# Patient Record
Sex: FEMALE | Race: WHITE | HISPANIC OR LATINO | Employment: FULL TIME | URBAN - METROPOLITAN AREA
[De-identification: names, ages, dates, MRNs, and addresses within clinical notes are randomized per-mention and may not be internally consistent; named-entity substitution may affect disease eponyms.]

---

## 2021-12-29 ENCOUNTER — HOSPITAL ENCOUNTER (OUTPATIENT)
Dept: GENERAL RADIOLOGY | Facility: HOSPITAL | Age: 56
Discharge: HOME | End: 2021-12-29

## 2021-12-29 ENCOUNTER — LAB (OUTPATIENT)
Dept: LAB | Facility: HOSPITAL | Age: 56
End: 2021-12-29

## 2021-12-29 ENCOUNTER — OFFICE VISIT (OUTPATIENT)
Dept: INTERNAL MEDICINE | Facility: CLINIC | Age: 56
End: 2021-12-29

## 2021-12-29 VITALS
OXYGEN SATURATION: 98 % | BODY MASS INDEX: 27.17 KG/M2 | SYSTOLIC BLOOD PRESSURE: 122 MMHG | WEIGHT: 173.1 LBS | HEIGHT: 67 IN | DIASTOLIC BLOOD PRESSURE: 84 MMHG | HEART RATE: 75 BPM

## 2021-12-29 DIAGNOSIS — L40.9 PSORIASIS: ICD-10-CM

## 2021-12-29 DIAGNOSIS — Z12.11 SCREEN FOR COLON CANCER: ICD-10-CM

## 2021-12-29 DIAGNOSIS — F51.01 PRIMARY INSOMNIA: ICD-10-CM

## 2021-12-29 DIAGNOSIS — M25.552 CHRONIC HIP PAIN, LEFT: ICD-10-CM

## 2021-12-29 DIAGNOSIS — Z00.00 HEALTHCARE MAINTENANCE: Primary | ICD-10-CM

## 2021-12-29 DIAGNOSIS — G89.29 CHRONIC HIP PAIN, LEFT: ICD-10-CM

## 2021-12-29 LAB
ALBUMIN SERPL-MCNC: 4.4 G/DL (ref 3.5–5.2)
ALBUMIN/GLOB SERPL: 1.7 G/DL
ALP SERPL-CCNC: 87 U/L (ref 39–117)
ALT SERPL W P-5'-P-CCNC: 17 U/L (ref 1–33)
ANION GAP SERPL CALCULATED.3IONS-SCNC: 11.5 MMOL/L (ref 5–15)
AST SERPL-CCNC: 13 U/L (ref 1–32)
BASOPHILS # BLD AUTO: 0.07 10*3/MM3 (ref 0–0.2)
BASOPHILS NFR BLD AUTO: 1.1 % (ref 0–1.5)
BILIRUB SERPL-MCNC: 0.3 MG/DL (ref 0–1.2)
BUN SERPL-MCNC: 14 MG/DL (ref 6–20)
BUN/CREAT SERPL: 26.9 (ref 7–25)
CALCIUM SPEC-SCNC: 9.3 MG/DL (ref 8.6–10.5)
CHLORIDE SERPL-SCNC: 103 MMOL/L (ref 98–107)
CHOLEST SERPL-MCNC: 221 MG/DL (ref 0–200)
CO2 SERPL-SCNC: 24.5 MMOL/L (ref 22–29)
CREAT SERPL-MCNC: 0.52 MG/DL (ref 0.57–1)
DEPRECATED RDW RBC AUTO: 43.9 FL (ref 37–54)
EOSINOPHIL # BLD AUTO: 0.11 10*3/MM3 (ref 0–0.4)
EOSINOPHIL NFR BLD AUTO: 1.8 % (ref 0.3–6.2)
ERYTHROCYTE [DISTWIDTH] IN BLOOD BY AUTOMATED COUNT: 14 % (ref 12.3–15.4)
GFR SERPL CREATININE-BSD FRML MDRD: 122 ML/MIN/1.73
GLOBULIN UR ELPH-MCNC: 2.6 GM/DL
GLUCOSE SERPL-MCNC: 103 MG/DL (ref 65–99)
HBA1C MFR BLD: 6.37 % (ref 4.8–5.6)
HCT VFR BLD AUTO: 42.4 % (ref 34–46.6)
HCV AB SER DONR QL: NORMAL
HDLC SERPL-MCNC: 42 MG/DL (ref 40–60)
HGB BLD-MCNC: 13.7 G/DL (ref 12–15.9)
LDLC SERPL CALC-MCNC: 154 MG/DL (ref 0–100)
LDLC/HDLC SERPL: 3.6 {RATIO}
LYMPHOCYTES # BLD AUTO: 1.6 10*3/MM3 (ref 0.7–3.1)
LYMPHOCYTES NFR BLD AUTO: 25.6 % (ref 19.6–45.3)
MCH RBC QN AUTO: 27.8 PG (ref 26.6–33)
MCHC RBC AUTO-ENTMCNC: 32.3 G/DL (ref 31.5–35.7)
MCV RBC AUTO: 86 FL (ref 79–97)
MONOCYTES # BLD AUTO: 0.68 10*3/MM3 (ref 0.1–0.9)
MONOCYTES NFR BLD AUTO: 10.9 % (ref 5–12)
NEUTROPHILS NFR BLD AUTO: 3.78 10*3/MM3 (ref 1.7–7)
NEUTROPHILS NFR BLD AUTO: 60.4 % (ref 42.7–76)
PLATELET # BLD AUTO: 293 10*3/MM3 (ref 140–450)
PMV BLD AUTO: 13.5 FL (ref 6–12)
POTASSIUM SERPL-SCNC: 4 MMOL/L (ref 3.5–5.2)
PROT SERPL-MCNC: 7 G/DL (ref 6–8.5)
RBC # BLD AUTO: 4.93 10*6/MM3 (ref 3.77–5.28)
SODIUM SERPL-SCNC: 139 MMOL/L (ref 136–145)
T4 FREE SERPL-MCNC: 1.27 NG/DL (ref 0.93–1.7)
TRIGL SERPL-MCNC: 138 MG/DL (ref 0–150)
TSH SERPL DL<=0.05 MIU/L-ACNC: 4.54 UIU/ML (ref 0.27–4.2)
VLDLC SERPL-MCNC: 25 MG/DL (ref 5–40)
WBC NRBC COR # BLD: 6.25 10*3/MM3 (ref 3.4–10.8)

## 2021-12-29 PROCEDURE — 85025 COMPLETE CBC W/AUTO DIFF WBC: CPT | Performed by: FAMILY MEDICINE

## 2021-12-29 PROCEDURE — 36415 COLL VENOUS BLD VENIPUNCTURE: CPT | Performed by: FAMILY MEDICINE

## 2021-12-29 PROCEDURE — 80053 COMPREHEN METABOLIC PANEL: CPT | Performed by: FAMILY MEDICINE

## 2021-12-29 PROCEDURE — 84439 ASSAY OF FREE THYROXINE: CPT | Performed by: FAMILY MEDICINE

## 2021-12-29 PROCEDURE — 80061 LIPID PANEL: CPT | Performed by: FAMILY MEDICINE

## 2021-12-29 PROCEDURE — 83036 HEMOGLOBIN GLYCOSYLATED A1C: CPT | Performed by: FAMILY MEDICINE

## 2021-12-29 PROCEDURE — 99386 PREV VISIT NEW AGE 40-64: CPT | Performed by: FAMILY MEDICINE

## 2021-12-29 PROCEDURE — 73502 X-RAY EXAM HIP UNI 2-3 VIEWS: CPT

## 2021-12-29 PROCEDURE — 99213 OFFICE O/P EST LOW 20 MIN: CPT | Performed by: FAMILY MEDICINE

## 2021-12-29 PROCEDURE — 86803 HEPATITIS C AB TEST: CPT | Performed by: FAMILY MEDICINE

## 2021-12-29 PROCEDURE — 84443 ASSAY THYROID STIM HORMONE: CPT | Performed by: FAMILY MEDICINE

## 2021-12-29 RX ORDER — PIMECROLIMUS 10 MG/G
1 CREAM TOPICAL DAILY
Qty: 100 G | Refills: 3 | Status: SHIPPED | OUTPATIENT
Start: 2021-12-29 | End: 2022-01-25 | Stop reason: SDUPTHER

## 2021-12-29 RX ORDER — ZOLPIDEM TARTRATE 6.25 MG/1
6.25 TABLET, FILM COATED, EXTENDED RELEASE ORAL NIGHTLY PRN
Qty: 30 TABLET | Refills: 3 | Status: SHIPPED | OUTPATIENT
Start: 2021-12-29 | End: 2023-01-27

## 2021-12-29 RX ORDER — PIMECROLIMUS 10 MG/G
1 CREAM TOPICAL DAILY
COMMUNITY
End: 2021-12-29 | Stop reason: SDUPTHER

## 2021-12-29 RX ORDER — CALCIPOTRIENE 50 UG/G
1 CREAM TOPICAL DAILY
COMMUNITY
End: 2021-12-29 | Stop reason: SDUPTHER

## 2021-12-29 RX ORDER — ATORVASTATIN CALCIUM 10 MG/1
10 TABLET, FILM COATED ORAL DAILY
Qty: 30 TABLET | Refills: 1 | Status: SHIPPED | OUTPATIENT
Start: 2021-12-29 | End: 2022-01-20

## 2021-12-29 RX ORDER — METFORMIN HYDROCHLORIDE 500 MG/1
500 TABLET, EXTENDED RELEASE ORAL
Qty: 30 TABLET | Refills: 1 | Status: SHIPPED | OUTPATIENT
Start: 2021-12-29 | End: 2022-01-20

## 2021-12-29 RX ORDER — CHOLECALCIFEROL (VITAMIN D3) 125 MCG
1 CAPSULE ORAL DAILY
COMMUNITY

## 2021-12-29 RX ORDER — CALCIPOTRIENE 50 UG/G
1 CREAM TOPICAL DAILY
Qty: 60 G | Refills: 1 | Status: SHIPPED | OUTPATIENT
Start: 2021-12-29 | End: 2022-01-25 | Stop reason: SDUPTHER

## 2021-12-29 RX ORDER — DIPHENOXYLATE HYDROCHLORIDE AND ATROPINE SULFATE 2.5; .025 MG/1; MG/1
1 TABLET ORAL DAILY
COMMUNITY

## 2021-12-29 NOTE — PROGRESS NOTES
Subjective   Gladis Chaves is a 56 y.o. female.     Chief Complaint   Patient presents with   • new patient visit   • Annual Exam   • fall 6 months ago - left hip painful         History of Present Illness   Gladis Chaves 56 y.o. female who presents for an Annual Wellness Visit.  she has a history of   Patient Active Problem List   Diagnosis   • Healthcare maintenance   • Chronic hip pain, left   • Screen for colon cancer   • Primary insomnia   • Psoriasis   .  she has been feeling well.  Labs results discussed in detail with the patient.  Plan to update vaccines if needed today.  I  reviewed health maintenance with her as part of my preventative care plan.    Health Habits:  Dental Exam. up to date  Eye Exam. up to date  Exercise: 5 times/week.  Current exercise activities include: walking      The following portions of the patient's history were reviewed and updated as appropriate: allergies, current medications, past family history, past medical history, past social history, past surgical history and problem list.    Review of Systems   Constitutional: Negative for appetite change, fever and unexpected weight change.   HENT: Negative for ear pain, facial swelling and sore throat.    Eyes: Negative for pain and visual disturbance.   Respiratory: Negative for chest tightness, shortness of breath and wheezing.    Cardiovascular: Negative for chest pain and palpitations.   Gastrointestinal: Negative for abdominal pain and blood in stool.   Endocrine: Negative.    Genitourinary: Negative for difficulty urinating and hematuria.   Musculoskeletal: Negative for joint swelling.   Neurological: Negative for tremors, seizures and syncope.   Hematological: Negative for adenopathy.   Psychiatric/Behavioral: Positive for sleep disturbance. Negative for dysphoric mood. The patient is not nervous/anxious.        Objective   Physical Exam  Vitals and nursing note reviewed.   Constitutional:       Appearance: Normal appearance.  She is well-developed. She is not diaphoretic.   HENT:      Head: Normocephalic and atraumatic.      Right Ear: Tympanic membrane, ear canal and external ear normal.      Left Ear: Tympanic membrane, ear canal and external ear normal.   Eyes:      General: Lids are normal. No scleral icterus.     Extraocular Movements: Extraocular movements intact.      Conjunctiva/sclera: Conjunctivae normal.   Neck:      Thyroid: No thyroid mass or thyromegaly.      Vascular: No carotid bruit or JVD.   Cardiovascular:      Rate and Rhythm: Normal rate and regular rhythm.      Pulses: Normal pulses.           Radial pulses are 2+ on the right side and 2+ on the left side.      Heart sounds: Normal heart sounds. No murmur heard.      Pulmonary:      Effort: Pulmonary effort is normal. No respiratory distress.      Breath sounds: Normal breath sounds.   Abdominal:      Palpations: Abdomen is soft.      Tenderness: There is no right CVA tenderness or left CVA tenderness.   Musculoskeletal:      Cervical back: Normal range of motion.      Right lower leg: No edema.      Left lower leg: No edema.   Skin:     General: Skin is warm and dry.      Coloration: Skin is not pale.      Findings: Erythema and rash present.   Neurological:      General: No focal deficit present.      Mental Status: She is alert and oriented to person, place, and time.      Sensory: No sensory deficit.      Deep Tendon Reflexes: Reflexes are normal and symmetric.   Psychiatric:         Mood and Affect: Mood normal.         Behavior: Behavior normal. Behavior is cooperative.         Thought Content: Thought content normal.         Judgment: Judgment normal.         Assessment/Plan   Diagnoses and all orders for this visit:    1. Healthcare maintenance (Primary)  -     Comprehensive Metabolic Panel  -     Lipid Panel  -     Hemoglobin A1c  -     TSH  -     T4, Free  -     CBC & Differential  -     zolpidem CR (AMBIEN CR) 6.25 MG CR tablet; Take 1 tablet by mouth At  Night As Needed for Sleep.  Dispense: 30 tablet; Refill: 3  -     Hepatitis C Antibody    2. Chronic hip pain, left  -     XR hip w or wo pelvis 2-3 view left    3. Screen for colon cancer  -     Cologuard - Stool, Per Rectum; Future    4. Psoriasis  -     calcipotriene (DOVONEX) 0.005 % cream; Apply 1 application topically to the appropriate area as directed Daily.  Dispense: 60 g; Refill: 1  -     pimecrolimus (ELIDEL) 1 % cream; Apply 1 application topically to the appropriate area as directed Daily.  Dispense: 100 g; Refill: 3    5. Primary insomnia    Continue attempts at healthy lifestyle calorie appropriate diet regular physical activity  She is up-to-date with her mammograms  Discussion regarding colon cancer screening no family history no change in bowel habits appropriate for Cologuard testing.  Encouraged shingles vaccine  Up otherwise as needed for ongoing management of chronic problems including left hip pain psoriasis otherwise preventatively annually            Patient/Caregiver provided printed discharge information.

## 2021-12-29 NOTE — PROGRESS NOTES
"Chief Complaint  new patient visit, Annual Exam, and fall 6 months ago - left hip painful  Insomnia, psoriasis  Subjective          Gladis Chaves presents to [facility]  History of Present Illness  New patient in the office with follow-up for chronic problems of psoriasis left hip pain her psoriasis is well controlled with Dovonex and Elidel and she like refills she has had psoriasis for many years and no specific change or worsening symptoms  Her left hip has been chronic in nature seems to worsen over the last 6 months there is no radiating quality and no specific traumatic event.  Her insomnia is mostly related to difficulty maintaining sleep she has no trouble falling asleep but seems to wake up in about 2 to 3 hours and then she is up for the rest the night chronic in nature she is used over-the-counter remedies without any benefit    Objective   Vital Signs:   /84 (BP Location: Right arm, Patient Position: Sitting, Cuff Size: Adult)   Pulse 75   Ht 170.2 cm (67\")   Wt 78.5 kg (173 lb 1.6 oz)   SpO2 98%   BMI 27.11 kg/m²     Physical Exam  Vitals and nursing note reviewed.   Constitutional:       Appearance: Normal appearance.   HENT:      Head: Normocephalic and atraumatic.   Eyes:      General: No scleral icterus.  Cardiovascular:      Rate and Rhythm: Normal rate and regular rhythm.   Pulmonary:      Effort: Pulmonary effort is normal.      Breath sounds: Normal breath sounds.   Skin:     Findings: Erythema and rash present.   Neurological:      General: No focal deficit present.      Mental Status: She is alert.   Psychiatric:         Mood and Affect: Mood normal.         Behavior: Behavior normal.         Thought Content: Thought content normal.         Judgment: Judgment normal.        Result Review :                   Assessment and Plan    Diagnoses and all orders for this visit:    1. Healthcare maintenance (Primary)  -     Comprehensive Metabolic Panel  -     " Lipid Panel  -     Hemoglobin A1c  -     TSH  -     T4, Free  -     CBC & Differential  -     zolpidem CR (AMBIEN CR) 6.25 MG CR tablet; Take 1 tablet by mouth At Night As Needed for Sleep.  Dispense: 30 tablet; Refill: 3  -     Hepatitis C Antibody    2. Chronic hip pain, left  -     XR hip w or wo pelvis 2-3 view left    3. Screen for colon cancer  -     Cologuard - Stool, Per Rectum; Future    4. Psoriasis  -     calcipotriene (DOVONEX) 0.005 % cream; Apply 1 application topically to the appropriate area as directed Daily.  Dispense: 60 g; Refill: 1  -     pimecrolimus (ELIDEL) 1 % cream; Apply 1 application topically to the appropriate area as directed Daily.  Dispense: 100 g; Refill: 3    5. Primary insomnia        Follow Up   No follow-ups on file.  Patient was given instructions and counseling regarding her condition or for health maintenance advice. Please see specific information pulled into the AVS if appropriate.

## 2021-12-30 DIAGNOSIS — G89.29 CHRONIC HIP PAIN, LEFT: Primary | ICD-10-CM

## 2021-12-30 DIAGNOSIS — M25.552 CHRONIC HIP PAIN, LEFT: Primary | ICD-10-CM

## 2022-01-12 ENCOUNTER — TELEPHONE (OUTPATIENT)
Dept: INTERNAL MEDICINE | Facility: CLINIC | Age: 57
End: 2022-01-12

## 2022-01-12 NOTE — TELEPHONE ENCOUNTER
Caller: Gladis Chaves    Relationship to patient: Self    Best call back number: [phone]     Patient is needing: PATIENT IS CALLING TO CHECK THE STATUS OF A PRIOR AUTHORIZATION FOR THE FOLLOWING MEDICATIONS.  pimecrolimus (ELIDEL) 1 % cream  calcipotriene (DOVONEX) 0.005 % cream    SHE STATES IT SH[facility] GO THROUGH [facility].      [facility] - [place], [place] - [address] AT [address] [facility] - [phone]  - [phone] [phone]    PLEASE ADVISE.

## 2022-01-20 RX ORDER — METFORMIN HYDROCHLORIDE 500 MG/1
TABLET, EXTENDED RELEASE ORAL
Qty: 30 TABLET | Refills: 1 | Status: SHIPPED | OUTPATIENT
Start: 2022-01-20 | End: 2022-02-17

## 2022-01-20 RX ORDER — ATORVASTATIN CALCIUM 10 MG/1
TABLET, FILM COATED ORAL
Qty: 30 TABLET | Refills: 1 | Status: SHIPPED | OUTPATIENT
Start: 2022-01-20 | End: 2022-02-17

## 2022-01-25 ENCOUNTER — OFFICE VISIT (OUTPATIENT)
Dept: INTERNAL MEDICINE | Facility: CLINIC | Age: 57
End: 2022-01-25

## 2022-01-25 ENCOUNTER — LAB (OUTPATIENT)
Dept: LAB | Facility: HOSPITAL | Age: 57
End: 2022-01-25

## 2022-01-25 VITALS
SYSTOLIC BLOOD PRESSURE: 142 MMHG | TEMPERATURE: 97.7 F | WEIGHT: 174.7 LBS | BODY MASS INDEX: 27.42 KG/M2 | HEART RATE: 73 BPM | OXYGEN SATURATION: 99 % | DIASTOLIC BLOOD PRESSURE: 92 MMHG | HEIGHT: 67 IN

## 2022-01-25 DIAGNOSIS — E11.65 TYPE 2 DIABETES MELLITUS WITH HYPERGLYCEMIA, WITHOUT LONG-TERM CURRENT USE OF INSULIN: Primary | ICD-10-CM

## 2022-01-25 DIAGNOSIS — R79.89 ELEVATED TSH: ICD-10-CM

## 2022-01-25 DIAGNOSIS — F51.01 PRIMARY INSOMNIA: ICD-10-CM

## 2022-01-25 DIAGNOSIS — E78.2 MIXED HYPERLIPIDEMIA: ICD-10-CM

## 2022-01-25 DIAGNOSIS — L40.9 PSORIASIS: ICD-10-CM

## 2022-01-25 PROBLEM — E11.9 TYPE 2 DIABETES MELLITUS, WITHOUT LONG-TERM CURRENT USE OF INSULIN: Status: ACTIVE | Noted: 2022-01-25

## 2022-01-25 LAB
T4 FREE SERPL-MCNC: 1.35 NG/DL (ref 0.93–1.7)
TSH SERPL DL<=0.05 MIU/L-ACNC: 4.13 UIU/ML (ref 0.27–4.2)

## 2022-01-25 PROCEDURE — 84443 ASSAY THYROID STIM HORMONE: CPT | Performed by: FAMILY MEDICINE

## 2022-01-25 PROCEDURE — 99213 OFFICE O/P EST LOW 20 MIN: CPT | Performed by: FAMILY MEDICINE

## 2022-01-25 PROCEDURE — 84439 ASSAY OF FREE THYROXINE: CPT | Performed by: FAMILY MEDICINE

## 2022-01-25 PROCEDURE — 36415 COLL VENOUS BLD VENIPUNCTURE: CPT | Performed by: FAMILY MEDICINE

## 2022-01-25 RX ORDER — PIMECROLIMUS 10 MG/G
1 CREAM TOPICAL DAILY
Qty: 100 G | Refills: 3 | Status: SHIPPED | OUTPATIENT
Start: 2022-01-25 | End: 2023-01-27

## 2022-01-25 RX ORDER — CALCIPOTRIENE 50 UG/G
1 CREAM TOPICAL DAILY
Qty: 60 G | Refills: 1 | Status: SHIPPED | OUTPATIENT
Start: 2022-01-25

## 2022-01-25 RX ORDER — AMOXICILLIN 875 MG/1
TABLET, COATED ORAL
COMMUNITY
Start: 2022-01-02 | End: 2022-01-25

## 2022-01-25 NOTE — PROGRESS NOTES
"Chief Complaint  follow up to diabetes, follow up to hyperlipidemia, follow up to insomnia, and Cough (tested COVID negative yesterday at [facility])    Subjective          Gladis Chaves presents to [facility]  History of Present Illness  Patient follows up for ongoing management of chronic problems she has had psoriasis for 20 years improved with elidel and calciporine, which she would like refills  She has tolerated Metformin without any unwanted side effects specifically GI  She has had slightly elevated TSH with normal T4 without any specific symptomology related other than difficulty with weight loss even though she has had some calorie restriction diet  Objective   Vital Signs:   /92 (BP Location: Left arm, Patient Position: Sitting, Cuff Size: Adult)   Pulse 73   Temp 97.7 °F (36.5 °C) (Infrared)   Ht 170.2 cm (67\")   Wt 79.2 kg (174 lb 11.2 oz)   SpO2 99%   BMI 27.36 kg/m²     Physical Exam  Vitals and nursing note reviewed.   Constitutional:       Appearance: Normal appearance.   HENT:      Head: Normocephalic and atraumatic.   Neck:      Comments: No enlarged thyroid   Cardiovascular:      Rate and Rhythm: Normal rate and regular rhythm.   Musculoskeletal:      Cervical back: No rigidity.   Skin:     Comments: legs arms hips scaling erythema plaques   Neurological:      Mental Status: She is alert.   Psychiatric:         Mood and Affect: Mood normal.         Behavior: Behavior normal.         Thought Content: Thought content normal.         Judgment: Judgment normal.        Result Review :     Common labs    Common Labsle 12/29/21 12/29/21 12/29/21 12/29/21    1016 1016 1016 1016   Glucose  103 (A)     BUN  14     Creatinine  0.52 (A)     eGFR Non African Am  122     Sodium  139     Potassium  4.0     Chloride  103     Calcium  9.3     Albumin  4.40     Total Bilirubin  0.3     Alkaline Phosphatase  87     AST (SGOT)  13     ALT (SGPT)  17     WBC 6.25      Hemoglobin " 13.7      Hematocrit 42.4      Platelets 293      Total Cholesterol   221 (A)    Triglycerides   138    HDL Cholesterol   42    LDL Cholesterol    154 (A)    Hemoglobin A1C    6.37 (A)   (A) Abnormal value                      Assessment and Plan    Diagnoses and all orders for this visit:    1. Type 2 diabetes mellitus with hyperglycemia, without long-term current use of insulin (HCC) (Primary)    2. Primary insomnia    3. Mixed hyperlipidemia    4. Elevated TSH  -     TSH  -     T4, Free    5. Psoriasis  -     pimecrolimus (ELIDEL) 1 % cream; Apply 1 application topically to the appropriate area as directed Daily.  Dispense: 100 g; Refill: 3  -     calcipotriene (DOVONEX) 0.005 % cream; Apply 1 application topically to the appropriate area as directed Daily.  Dispense: 60 g; Refill: 1    Follow-up results of thyroid function otherwise as needed or    Follow Up   Return in about 3 months (around 4/25/2022), or if symptoms worsen or fail to improve, for Recheck.  Patient was given instructions and counseling regarding her condition or for health maintenance advice. Please see specific information pulled into the AVS if appropriate.     Check A1c

## 2022-01-26 ENCOUNTER — OFFICE VISIT (OUTPATIENT)
Dept: ORTHOPEDIC SURGERY | Facility: CLINIC | Age: 57
End: 2022-01-26

## 2022-01-26 VITALS — HEIGHT: 67 IN | WEIGHT: 174 LBS | TEMPERATURE: 97.3 F | BODY MASS INDEX: 27.31 KG/M2

## 2022-01-26 DIAGNOSIS — M16.12 PRIMARY OSTEOARTHRITIS OF LEFT HIP: Primary | ICD-10-CM

## 2022-01-26 PROCEDURE — 99203 OFFICE O/P NEW LOW 30 MIN: CPT | Performed by: ORTHOPAEDIC SURGERY

## 2022-01-26 NOTE — PROGRESS NOTES
Patient: Gladis Chaves    YOB: 1965    Medical Record Number: [id number]    Chief Complaints:  Left hip pain    History of Present Illness:     56 y.o. female patient who comes in today for evaluation of a new complaint of  Left hip pain.   Patient states she started having pain last spring which brought about 8 months ago after a fall however since October this past year or about 3 to 4 months ago she feels like things got worse complains of groin pain and stiffness.  Difficulty with some regular activity such as walking.  Does take Advil which she feels helps.        Denies any shooting pain down the leg, weakness, numbness or paresthesias.  Denies any fever shortness of breath.    Allergies: No Known Allergies    Medications:     Home Medications:  Current Outpatient Medications on File Prior to Visit   Medication Sig Dispense Refill   • atorvastatin (LIPITOR) 10 MG tablet TAKE 1 TABLET BY MOUTH EVERY DAY 30 tablet 1   • CALCIUM PO Take 650 mg by mouth Daily.     • Cholecalciferol (Vitamin D3) 50 MCG (2000 UT) tablet Take 1 tablet by mouth Daily.     • metFORMIN ER (GLUCOPHAGE-XR) 500 MG 24 hr tablet TAKE 1 TABLET BY MOUTH EVERY DAY WITH BREAKFAST 30 tablet 1   • multivitamin (THERAGRAN) tablet tablet Take 1 tablet by mouth Daily.     • Boswellia-Glucosamine-Vit D (OSTEO BI-FLEX ONE PER DAY PO) Take 1 tablet by mouth Daily.     • calcipotriene (DOVONEX) 0.005 % cream Apply 1 application topically to the appropriate area as directed Daily. 60 g 1   • pimecrolimus (ELIDEL) 1 % cream Apply 1 application topically to the appropriate area as directed Daily. 100 g 3   • zolpidem CR (AMBIEN CR) 6.25 MG CR tablet Take 1 tablet by mouth At Night As Needed for Sleep. 30 tablet 3     No current facility-administered medications on file prior to visit.     History reviewed. No pertinent past medical history.  History reviewed. No pertinent surgical history.  Social History     Occupational History   • Not  "on file   Tobacco Use   • Smoking status: Never Smoker   • Smokeless tobacco: Never Used   Substance and Sexual Activity   • Alcohol use: Yes     Comment: 1 per month   • Drug use: Not Currently   • Sexual activity: Yes     Partners: Male      Social History     Social History Narrative   • Not on file     Family History   Problem Relation Age of Onset   • Alzheimer's disease Mother    • Heart failure Mother    • Hypertension Mother    • Diabetes Father    • Heart attack Father    • Hypertension Father    • Diabetes Brother    • Glaucoma Paternal Grandmother    • Diabetes Paternal Grandfather    • Lung cancer Paternal Grandfather    • Diabetes Paternal Uncle        Review of Systems:      Constitutional: Denies fever, shaking or chills         All other pertinent positives and negatives as noted above in HPI.    Physical Exam: 56 y.o. female  Vitals:    01/26/22 0847   Temp: 97.3 °F (36.3 °C)   Weight: 78.9 kg (174 lb)   Height: 168.9 cm (66.5\")     General:  Patient is awake and alert.  Appears in no acute distress or discomfort.        Left lower extremity:  Skin is benign.  No palpable masses or adenopathy.  No focal area of tenderness.  Hip motion is limited and uncomfortable.  Positive Stinchfield maneuver.  Good strength with hip flexion, extension, abduction.  Good strength distally with plantarflexion and dorsiflexion of ankle, toes.  Sensation to light touch intact distally in the lower leg and foot.  Good pedal pulses with brisk cap refill.    Radiology: AP pelvis, AP and lateral views of the left hip taken previously reviewed the x-rays show moderate hip osteoarthritis with joint space narrowing, subchondral sclerosis and osteophyte formation.  There are no obvious acute abnormalities, lesions, masses, or other concerning findings.    Comparison films not available    Assessment:  Left hip osteoarthritis      Plan:    I had a lengthy discussion with the patient regarding options, both surgical and " non-surgical.  I have recommended that we start with a conservative approach and suggested anti-inflammatories, physical therapy, and an injection.  All options were thoroughly discussed with the patient.  The patient has elected for conservative treatment at this time she will continue some anti-inflammatories would like [facility] physical therapy.  Possible injection therapy is not really helping her in 3 to 4 weeks she does not want to have any surgery at this time.  Will follow up as needed. Patient understands and agrees.      Linden Schuler MD    01/26/2022    CC to Dimitri Delacruz MD

## 2022-02-14 ENCOUNTER — TELEPHONE (OUTPATIENT)
Dept: INTERNAL MEDICINE | Facility: CLINIC | Age: 57
End: 2022-02-14

## 2022-02-14 NOTE — TELEPHONE ENCOUNTER
Caller: Gladis Chaves    Relationship: Self    Best call back number: [phone]    Which medication are you concerned about: pimecrolimus (ELIDEL) 1 % cream    Who prescribed you this medication: DR IRIZARRY    What are your concerns: PATIENT STATES HER PHARMACY CONTACTED HER TO SAY SHE NEEDS A PRIOR AUTHORIZATION ON THIS MEDICATION.  THEY LEFT THE PRESCRIPTION NUMBER OF [phone]    PLEASE CALL AND ADVISE

## 2022-02-17 RX ORDER — METFORMIN HYDROCHLORIDE 500 MG/1
TABLET, EXTENDED RELEASE ORAL
Qty: 30 TABLET | Refills: 4 | Status: SHIPPED | OUTPATIENT
Start: 2022-02-17 | End: 2022-05-20

## 2022-02-17 RX ORDER — ATORVASTATIN CALCIUM 10 MG/1
TABLET, FILM COATED ORAL
Qty: 30 TABLET | Refills: 4 | Status: SHIPPED | OUTPATIENT
Start: 2022-02-17 | End: 2022-05-20

## 2022-05-20 RX ORDER — ATORVASTATIN CALCIUM 10 MG/1
TABLET, FILM COATED ORAL
Qty: 90 TABLET | Refills: 0 | Status: SHIPPED | OUTPATIENT
Start: 2022-05-20 | End: 2022-08-17

## 2022-05-20 RX ORDER — METFORMIN HYDROCHLORIDE 500 MG/1
TABLET, EXTENDED RELEASE ORAL
Qty: 90 TABLET | Refills: 0 | Status: SHIPPED | OUTPATIENT
Start: 2022-05-20 | End: 2022-08-17

## 2022-06-17 ENCOUNTER — TELEPHONE (OUTPATIENT)
Dept: INTERNAL MEDICINE | Facility: CLINIC | Age: 57
End: 2022-06-17

## 2022-06-17 NOTE — TELEPHONE ENCOUNTER
Caller: Gladis Chaves    Relationship: Self    Best call back number:     What orders are you requesting (i.e. lab or imaging): MAMMOGRAM    In what timeframe would the patient need to come in:     Where will you receive your lab/imaging services:     Additional notes: PATIENT IS CALLING IN TO GET ORDERS FOR A MAMMOGRAM.  SHE SAYS THE IMAGING  OFFICE IS NEXT TO DR PINK OFFICE, SHE DOES NOT KNOW THE NAME OF THE FACILITY.  PATIENT WANTS TO BE CONTACTED ONCE THIS IS COMPLETED.

## 2022-06-20 NOTE — TELEPHONE ENCOUNTER
Caller: Gladis Chaves    Relationship: Self    Best call back number: [phone]*    What is the best time to reach you: MORNINGS    Who are you requesting to speak with (clinical staff, provider,  specific staff member): CLINICAL    What was the call regarding: PATIENT CALLING TO CHECK ON STATUS OF ORDER FOR MAMMOGRAM.    Do you require a callback: YES

## 2022-06-21 DIAGNOSIS — Z12.31 ENCOUNTER FOR SCREENING MAMMOGRAM FOR MALIGNANT NEOPLASM OF BREAST: Primary | ICD-10-CM

## 2022-07-07 ENCOUNTER — HOSPITAL ENCOUNTER (OUTPATIENT)
Dept: MAMMOGRAPHY | Facility: HOSPITAL | Age: 57
Discharge: HOME | End: 2022-07-07
Admitting: FAMILY MEDICINE

## 2022-07-07 DIAGNOSIS — Z12.31 ENCOUNTER FOR SCREENING MAMMOGRAM FOR MALIGNANT NEOPLASM OF BREAST: ICD-10-CM

## 2022-07-07 PROCEDURE — 77063 BREAST TOMOSYNTHESIS BI: CPT

## 2022-07-07 PROCEDURE — 77067 SCR MAMMO BI INCL CAD: CPT

## 2022-07-14 ENCOUNTER — TELEPHONE (OUTPATIENT)
Dept: INTERNAL MEDICINE | Facility: CLINIC | Age: 57
End: 2022-07-14

## 2022-07-14 NOTE — TELEPHONE ENCOUNTER
Caller: Gladis Chaves    Relationship: Self    Best call back number:[phone]     Caller requesting test results: MAMMOGRAM RESULTS     When was the test performed: 07.08.22    Where was the test performed: [facility]    Additional notes: PATIENT LOOKED AT RESULTS ON [facility] AND IS CONCERNED AND CONFUSED AFTER READING RESULTS.     PATIENT IS REQUESTING TO SPEAK WITH DR. IRIZARRY SPECIFICALLY.    PLEASE CALL ASAP TO DISCUSS RESULTS.

## 2022-07-15 DIAGNOSIS — R92.8 ABNORMAL MAMMOGRAM: Primary | ICD-10-CM

## 2022-07-26 ENCOUNTER — OFFICE VISIT (OUTPATIENT)
Dept: INTERNAL MEDICINE | Facility: CLINIC | Age: 57
End: 2022-07-26

## 2022-07-26 ENCOUNTER — LAB (OUTPATIENT)
Dept: LAB | Facility: HOSPITAL | Age: 57
End: 2022-07-26

## 2022-07-26 VITALS
DIASTOLIC BLOOD PRESSURE: 82 MMHG | OXYGEN SATURATION: 100 % | HEART RATE: 67 BPM | WEIGHT: 167.3 LBS | HEIGHT: 67 IN | SYSTOLIC BLOOD PRESSURE: 124 MMHG | BODY MASS INDEX: 26.26 KG/M2

## 2022-07-26 DIAGNOSIS — F51.01 PRIMARY INSOMNIA: ICD-10-CM

## 2022-07-26 DIAGNOSIS — G89.29 CHRONIC HIP PAIN, LEFT: ICD-10-CM

## 2022-07-26 DIAGNOSIS — E78.2 MIXED HYPERLIPIDEMIA: Primary | ICD-10-CM

## 2022-07-26 DIAGNOSIS — E11.65 TYPE 2 DIABETES MELLITUS WITH HYPERGLYCEMIA, WITHOUT LONG-TERM CURRENT USE OF INSULIN: ICD-10-CM

## 2022-07-26 DIAGNOSIS — M25.552 CHRONIC HIP PAIN, LEFT: ICD-10-CM

## 2022-07-26 LAB
ALBUMIN SERPL-MCNC: 4.2 G/DL (ref 3.5–5.2)
ALBUMIN UR-MCNC: <1.2 MG/DL
ALBUMIN/GLOB SERPL: 1.6 G/DL
ALP SERPL-CCNC: 83 U/L (ref 39–117)
ALT SERPL W P-5'-P-CCNC: 16 U/L (ref 1–33)
ANION GAP SERPL CALCULATED.3IONS-SCNC: 10.9 MMOL/L (ref 5–15)
AST SERPL-CCNC: 17 U/L (ref 1–32)
BILIRUB SERPL-MCNC: 0.3 MG/DL (ref 0–1.2)
BUN SERPL-MCNC: 17 MG/DL (ref 6–20)
BUN/CREAT SERPL: 23.6 (ref 7–25)
CALCIUM SPEC-SCNC: 9.2 MG/DL (ref 8.6–10.5)
CHLORIDE SERPL-SCNC: 100 MMOL/L (ref 98–107)
CHOLEST SERPL-MCNC: 147 MG/DL (ref 0–200)
CO2 SERPL-SCNC: 27.1 MMOL/L (ref 22–29)
CREAT SERPL-MCNC: 0.72 MG/DL (ref 0.57–1)
CREAT UR-MCNC: 76.6 MG/DL
EGFRCR SERPLBLD CKD-EPI 2021: 97.7 ML/MIN/1.73
GLOBULIN UR ELPH-MCNC: 2.7 GM/DL
GLUCOSE SERPL-MCNC: 100 MG/DL (ref 65–99)
HBA1C MFR BLD: 6.4 % (ref 4.8–5.6)
HDLC SERPL-MCNC: 44 MG/DL (ref 40–60)
LDLC SERPL CALC-MCNC: 87 MG/DL (ref 0–100)
LDLC/HDLC SERPL: 1.97 {RATIO}
MICROALBUMIN/CREAT UR: NORMAL MG/G{CREAT}
POTASSIUM SERPL-SCNC: 4 MMOL/L (ref 3.5–5.2)
PROT SERPL-MCNC: 6.9 G/DL (ref 6–8.5)
SODIUM SERPL-SCNC: 138 MMOL/L (ref 136–145)
TRIGL SERPL-MCNC: 82 MG/DL (ref 0–150)
VLDLC SERPL-MCNC: 16 MG/DL (ref 5–40)

## 2022-07-26 PROCEDURE — 99214 OFFICE O/P EST MOD 30 MIN: CPT | Performed by: FAMILY MEDICINE

## 2022-07-26 PROCEDURE — 83036 HEMOGLOBIN GLYCOSYLATED A1C: CPT | Performed by: FAMILY MEDICINE

## 2022-07-26 PROCEDURE — 36415 COLL VENOUS BLD VENIPUNCTURE: CPT | Performed by: FAMILY MEDICINE

## 2022-07-26 PROCEDURE — 80061 LIPID PANEL: CPT | Performed by: FAMILY MEDICINE

## 2022-07-26 PROCEDURE — 82570 ASSAY OF URINE CREATININE: CPT | Performed by: FAMILY MEDICINE

## 2022-07-26 PROCEDURE — 80053 COMPREHEN METABOLIC PANEL: CPT | Performed by: FAMILY MEDICINE

## 2022-07-26 PROCEDURE — 82043 UR ALBUMIN QUANTITATIVE: CPT | Performed by: FAMILY MEDICINE

## 2022-07-26 NOTE — PROGRESS NOTES
"Chief Complaint  follow up to diabetes and follow up to hyperlipidemia    Subjective        Gladis Chaves presents to [facility]  History of Present Illness  Follow-up ongoing management of hyperlipidemia diabetes hip pain.  Does not want side effects of atorvastatin metformin which she is taking in the morning she still having trouble sleeping mostly due to chronic left hip pain she has seen orthopedics recommended anti-inflammatories  Patient would like to have physical therapy for further evaluation of her  left hip pain  Objective   Vital Signs:  /82 (BP Location: Left arm, Patient Position: Sitting, Cuff Size: Adult)   Pulse 67   Ht 168.9 cm (66.5\")   Wt 75.9 kg (167 lb 4.8 oz)   SpO2 100%   BMI 26.60 kg/m²   Estimated body mass index is 26.6 kg/m² as calculated from the following:    Height as of this encounter: 168.9 cm (66.5\").    Weight as of this encounter: 75.9 kg (167 lb 4.8 oz).          Physical Exam  Vitals and nursing note reviewed.   Constitutional:       Appearance: Normal appearance.   HENT:      Head: Normocephalic and atraumatic.   Cardiovascular:      Rate and Rhythm: Normal rate and regular rhythm.      Pulses: Normal pulses.      Heart sounds: Normal heart sounds.   Musculoskeletal:      Right lower leg: No edema.      Left lower leg: No edema.   Skin:     General: Skin is warm and dry.   Neurological:      Mental Status: She is alert.   Psychiatric:         Mood and Affect: Mood normal.         Behavior: Behavior normal.         Thought Content: Thought content normal.         Judgment: Judgment normal.        Result Review :    Common labs    Common Labsle 12/29/21 12/29/21 12/29/21 12/29/21    1016 1016 1016 1016   Glucose  103 (A)     BUN  14     Creatinine  0.52 (A)     eGFR Non African Am  122     Sodium  139     Potassium  4.0     Chloride  103     Calcium  9.3     Albumin  4.40     Total Bilirubin  0.3     Alkaline Phosphatase  87     AST " (SGOT)  13     ALT (SGPT)  17     WBC 6.25      Hemoglobin 13.7      Hematocrit 42.4      Platelets 293      Total Cholesterol   221 (A)    Triglycerides   138    HDL Cholesterol   42    LDL Cholesterol    154 (A)    Hemoglobin A1C    6.37 (A)   (A) Abnormal value                      Assessment and Plan   Diagnoses and all orders for this visit:    1. Mixed hyperlipidemia (Primary)  -     Lipid Panel    2. Type 2 diabetes mellitus with hyperglycemia, without long-term current use of insulin (HCC)  -     Comprehensive Metabolic Panel  -     Hemoglobin A1c  -     Microalbumin / Creatinine Urine Ratio - Urine, Clean Catch  -     Lipid Panel    3. Chronic hip pain, left    4. Primary insomnia    Hip pain consult PT  Insomnia hopefully will improve with addressing pain issues follow-up otherwise results of blood work and as needed or         Follow Up   Return in about 6 months (around 1/26/2023), or if symptoms worsen or fail to improve, for Recheck.  Patient was given instructions and counseling regarding her condition or for health maintenance advice. Please see specific information pulled into the AVS if appropriate.

## 2022-07-28 ENCOUNTER — HOSPITAL ENCOUNTER (OUTPATIENT)
Dept: ULTRASOUND IMAGING | Facility: HOSPITAL | Age: 57
End: 2022-07-28

## 2022-07-28 ENCOUNTER — HOSPITAL ENCOUNTER (OUTPATIENT)
Dept: MAMMOGRAPHY | Facility: HOSPITAL | Age: 57
Discharge: HOME | End: 2022-07-28
Admitting: FAMILY MEDICINE

## 2022-07-28 DIAGNOSIS — R92.8 ABNORMAL MAMMOGRAM: ICD-10-CM

## 2022-07-28 PROCEDURE — 77065 DX MAMMO INCL CAD UNI: CPT

## 2022-08-02 ENCOUNTER — TELEPHONE (OUTPATIENT)
Dept: INTERNAL MEDICINE | Facility: CLINIC | Age: 57
End: 2022-08-02

## 2022-08-02 NOTE — TELEPHONE ENCOUNTER
Hub staff attempted to follow warm transfer process and was unsuccessful     Caller: Gladis Chaves    Relationship to patient: Self    Best call back number: *  Gladis Chaves (Self) [phone] (H)       Patient is needing    CALLING BACK ABOUT HERSELF OR MOTHER IN LAW FLORINA CHAVES      PLEASE CALL AND ADVISE

## 2022-08-04 DIAGNOSIS — R92.8 ABNORMAL MAMMOGRAM: Primary | ICD-10-CM

## 2022-08-05 ENCOUNTER — TELEPHONE (OUTPATIENT)
Dept: INTERNAL MEDICINE | Facility: CLINIC | Age: 57
End: 2022-08-05

## 2022-08-05 ENCOUNTER — TELEPHONE (OUTPATIENT)
Dept: SURGERY | Facility: CLINIC | Age: 57
End: 2022-08-05

## 2022-08-05 NOTE — TELEPHONE ENCOUNTER
Caller: Gladis Chaves    Relationship: Self    Best call back number: [phone]    What is the best time to reach you: ANY TIME    Who are you requesting to speak with (clinical staff, provider,  specific staff member): CLINICAL STAFF    What was the call regarding: PATIENT CALLED WITH CONCERNS ABOUT HAVING TO SEE A PROVIDER BEFORE GETTING A BIOPSY, THE RADIOLOGIST ADVISED HER THAT SHE NEEDS A BREAST BIOPSY AND SHE DOESN'T KNOW WHY SHE HAS TO SEE A PROVIDER BEFORE GETTING THIS DONE. SHE IS GOING TO DO RESEARCH AND TRY TO FIND SOMEONE WHO WILL DO BIOPSY WITHOUT HAVING TO SEE PROVIDER FIRST, AND ALSO TO SEE WHO SHE WANTS AS HER ONCOLOGIST. SHE DOESN'T FEEL LIKE [facility] IS PUTTING ENOUGH URGENCY ON THE MATTER.    PLEASE ADVISE    Do you require a callback: YES

## 2022-08-05 NOTE — TELEPHONE ENCOUNTER
Called Ms. Chaves  left a message it is usual for patient to have discussion with physician before biopsy performed

## 2022-08-05 NOTE — TELEPHONE ENCOUNTER
New Patient appointment scheduled with Lanre Rashid NP on 8/18/2022 @ 8:40am.    Called Pt. Patient expressed v/u of appointment.    Appointment reminder and New Patient Packet sent in mail.

## 2022-08-17 RX ORDER — METFORMIN HYDROCHLORIDE 500 MG/1
TABLET, EXTENDED RELEASE ORAL
Qty: 90 TABLET | Refills: 0 | Status: SHIPPED | OUTPATIENT
Start: 2022-08-17 | End: 2022-11-14

## 2022-08-17 RX ORDER — ATORVASTATIN CALCIUM 10 MG/1
TABLET, FILM COATED ORAL
Qty: 90 TABLET | Refills: 0 | Status: SHIPPED | OUTPATIENT
Start: 2022-08-17 | End: 2022-11-14

## 2022-09-01 NOTE — PROGRESS NOTES
[facility]     Referring Provider: Dr. Delacruz     Chief complaint: abnormal breast imaging     HPI: Ms. Gladis Chaves is a 56 yo woman, seen at the request of Dr. Delacruz, for abnormal imaging.    I personally reviewed her records and summarized her relevant breast history/imagin2020 mammogram with Gildardo at [facility]  There are scattered areas of fibroglandular density.  There are no suspicious masses, significant calcifications, or other abnormality seen.  Impression  There is no mammographic evidence of malignancy.  BI-RADS 1    2021 mammogram with Gildardo at [facility]  There are scattered areas of fibroglandular density.  There are no suspicious masses, significant calcifications, or other abnormality seen.  Impression  There is no mammographic evidence of malignancy.  BI-RADS 1    2022 mammo screening with Gildardo at [facility]  FINDINGS:  There are scattered areas of fibroglandular density.  There are indeterminate calcifications in the upper outer posterior  right breast. There are no suspicious masses, calcifications, or areas  of architectural distortion in the left breast.  IMPRESSION/RECOMMENDATION(S):  Indeterminate right breast calcifications. Recommend further evaluation  with CC and lateral spot magnification views.  No mammographic evidence of malignancy in the left breast.  BI-RADS Category 0:     2022 diagnostic right mammogram at [facility]  FINDINGS:  MAMMOGRAM: Right CC and lateral spot magnification views were obtained..  There are scattered areas of fibroglandular density.   There is a 0.7 cm group of somewhat pleomorphic calcifications in the  upper outer posterior right breast, new from , which is suspicious.  IMPRESSION:  Suspicious 0.7 cm group of calcifications in the posterior right breast.  Recommend further evaluation with stereotactic core needle biopsy.  Findings and recommendations were discussed with the patient in person  at the  time of her examination. An Epic in basket message was sent to  Dr. Delacruz on 7/28/2022.  BI-RADS Category 4: Suspicious    She has a personal history of breast cyst aspiration 1993  She denies any family history of breast or ovarian cancer.     Today she presents with concerns regarding abnormal imaging.  She is not interested in risk assessment today.  States that she is not concerned about breast cancer and doesn't want to do anything preventative other than mammograms.  She denies any breast lumps, pain, skin changes, or nipple discharge.    She was by herself in clinic today.     Review of Systems - Oncology    Medications:    Current Outpatient Medications:   •  atorvastatin (LIPITOR) 10 MG tablet, TAKE 1 TABLET BY MOUTH EVERY DAY, Disp: 90 tablet, Rfl: 0  •  Boswellia-Glucosamine-Vit D (OSTEO BI-FLEX ONE PER DAY PO), Take 1 tablet by mouth Daily., Disp: , Rfl:   •  CALCIUM PO, Take 650 mg by mouth Daily., Disp: , Rfl:   •  Cholecalciferol (Vitamin D3) 50 MCG (2000 UT) tablet, Take 1 tablet by mouth Daily., Disp: , Rfl:   •  metFORMIN ER (GLUCOPHAGE-XR) 500 MG 24 hr tablet, TAKE 1 TABLET BY MOUTH EVERY DAY WITH BREAKFAST, Disp: 90 tablet, Rfl: 0  •  multivitamin (THERAGRAN) tablet tablet, Take 1 tablet by mouth Daily., Disp: , Rfl:   •  calcipotriene (DOVONEX) 0.005 % cream, Apply 1 application topically to the appropriate area as directed Daily., Disp: 60 g, Rfl: 1  •  pimecrolimus (ELIDEL) 1 % cream, Apply 1 application topically to the appropriate area as directed Daily., Disp: 100 g, Rfl: 3  •  zolpidem CR (AMBIEN CR) 6.25 MG CR tablet, Take 1 tablet by mouth At Night As Needed for Sleep., Disp: 30 tablet, Rfl: 3    Allergies:  No Known Allergies    Medical history:  History reviewed. No pertinent past medical history.    Surgical History:  Past Surgical History:   Procedure Laterality Date   • BREAST BIOPSY Right     benign over 20 years ago       Family History:  Family History   Problem Relation Age of  Onset   • Alzheimer's disease Mother    • Heart failure Mother    • Hypertension Mother    • Diabetes Father    • Heart attack Father    • Hypertension Father    • Diabetes Brother    • Glaucoma Paternal Grandmother    • Diabetes Paternal Grandfather    • Lung cancer Paternal Grandfather    • Diabetes Paternal Uncle    • Breast cancer Neg Hx    • Ovarian cancer Neg Hx        Social History:   Social History     Socioeconomic History   • Marital status:    Tobacco Use   • Smoking status: Never Smoker   • Smokeless tobacco: Never Used   Vaping Use   • Vaping Use: Never used   Substance and Sexual Activity   • Alcohol use: Yes     Comment: 1 per month   • Drug use: Not Currently   • Sexual activity: Yes     Partners: Male     Patient drinks 2 servings of caffeine per day.       GYNECOLOGIC HISTORY:   G: 3. P: 2. AB: 1.  Last menstrual period: 9 years  Age at menarche: 12  Age at first childbirth: 26  Lactation/How long: na  Age at menopause: na  Total years of oral contraceptive use: 15  Total years of hormone replacement therapy: na      Physical Exam  Vitals:    09/02/22 0902   BP: 138/82   Pulse: 67   SpO2: 96%     ECOG 0 - Asymptomatic  General: NAD, well appearing  Psych: a&o x 3, normal mood and affect  Eyes: EOMI, no scleral icterus  ENMT: neck supple without masses or thyromegaly, mucus membranes moist  Resp: normal effort, CTAB  CV: RRR, no murmurs, no edema   GI: soft, NT, ND  MSK: normal gait, normal ROM in bilateral shoulders  Lymph nodes:  no cervical, supraclavicular or axillary lymphadenopathy  Breast: symmetric, large  Right: No visible abnormalities on inspection while seated, with arms raised or hands on hips. No masses, skin changes, or nipple abnormalities.  Left: No visible abnormalities on inspection while seated, with arms raised or hands on hips. No masses, skin changes, or nipple abnormalities.      Assessment:    1)  57 y.o. F with a new diagnosis of abnormal imaging    Discussion:  1.  Discussed life time risk which right now is average but did discuss that it might change after biopsy results.      Plan:  1. Rt breast stereotatic biopsy, call with results  2. Monthly self breast exams  3. rto if any new concerns  4. Redo panchito after biopsy result    MIKAL Trotter    I have spent 30 min in face to face time with the patient and in chart review.      CC:  No ref. provider found  Dimitri Delacruz MD    EMR Dragon/transcription disclaimer:  Dictated using Dragon dictation

## 2022-09-02 ENCOUNTER — OFFICE VISIT (OUTPATIENT)
Dept: SURGERY | Facility: CLINIC | Age: 57
End: 2022-09-02

## 2022-09-02 ENCOUNTER — TELEPHONE (OUTPATIENT)
Dept: INTERNAL MEDICINE | Facility: CLINIC | Age: 57
End: 2022-09-02

## 2022-09-02 VITALS
DIASTOLIC BLOOD PRESSURE: 82 MMHG | HEART RATE: 67 BPM | BODY MASS INDEX: 27.19 KG/M2 | OXYGEN SATURATION: 96 % | HEIGHT: 66 IN | WEIGHT: 169.2 LBS | SYSTOLIC BLOOD PRESSURE: 138 MMHG

## 2022-09-02 DIAGNOSIS — R92.8 ABNORMAL FINDINGS ON DIAGNOSTIC IMAGING OF BREAST: Primary | ICD-10-CM

## 2022-09-02 DIAGNOSIS — R92.8 ABNORMAL MAMMOGRAM: Primary | ICD-10-CM

## 2022-09-02 PROCEDURE — 99203 OFFICE O/P NEW LOW 30 MIN: CPT | Performed by: NURSE PRACTITIONER

## 2022-09-02 NOTE — TELEPHONE ENCOUNTER
Caller: Gladis Chaves    Relationship: Self    Best call back number: [phone]    What form or medical record are you requesting: MAMMOGRAM RESULTS AND RADIOLOGIST REPORT     Who is requesting this form or medical record from you: [facility]    How would you like to receive the form or medical records (pick-up, mail, fax): FAX  If fax, what is the fax number: [phone]    [facility] OFFICE NUMBER: [phone]     Additional notes: PATIENT STATED THAT SHE WOULD ALSO LIKE TO GET A COPY OF HER MAMMOGRAM ON A CD AND THE RADIOLOGIST REPORT FOR HERSELF, AND SHE WOULD LIKE TO PICK THAT UP.     PATIENT ALSO WANTED TO LET DR IRIZARRY KNOW THAT HER BIOPSY SCHEDULED FOR 09/26/2022 AT [facility].

## 2022-09-29 ENCOUNTER — TELEPHONE (OUTPATIENT)
Dept: SURGERY | Facility: CLINIC | Age: 57
End: 2022-09-29

## 2022-09-29 NOTE — TELEPHONE ENCOUNTER
I called this pt to follow up on her stereotactic biopsy.  Pt cancelled that appt- I reached out to have this rescheduled but the pt said she has since changed providers and had this procedure done elsewhere. She is being followed by a different provider for this.  Pt did not provide any further information.     AJNELLE

## 2022-10-04 ENCOUNTER — APPOINTMENT (OUTPATIENT)
Dept: MAMMOGRAPHY | Facility: HOSPITAL | Age: 57
End: 2022-10-04

## 2022-11-14 RX ORDER — METFORMIN HYDROCHLORIDE 500 MG/1
TABLET, EXTENDED RELEASE ORAL
Qty: 90 TABLET | Refills: 0 | Status: SHIPPED | OUTPATIENT
Start: 2022-11-14

## 2022-11-14 RX ORDER — ATORVASTATIN CALCIUM 10 MG/1
TABLET, FILM COATED ORAL
Qty: 90 TABLET | Refills: 0 | Status: SHIPPED | OUTPATIENT
Start: 2022-11-14

## 2025-08-11 ENCOUNTER — NURSE TRIAGE (OUTPATIENT)
Dept: CALL CENTER | Facility: HOSPITAL | Age: 60
End: 2025-08-11
Payer: COMMERCIAL